# Patient Record
Sex: MALE | Race: WHITE | NOT HISPANIC OR LATINO | Employment: FULL TIME | URBAN - METROPOLITAN AREA
[De-identification: names, ages, dates, MRNs, and addresses within clinical notes are randomized per-mention and may not be internally consistent; named-entity substitution may affect disease eponyms.]

---

## 2017-09-04 ENCOUNTER — HOSPITAL ENCOUNTER (EMERGENCY)
Facility: HOSPITAL | Age: 22
Discharge: HOME | End: 2017-09-05
Attending: FAMILY MEDICINE
Payer: COMMERCIAL

## 2017-09-04 DIAGNOSIS — S93.432A SPRAIN OF TIBIOFIBULAR LIGAMENT OF LEFT ANKLE, INITIAL ENCOUNTER: Primary | ICD-10-CM
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
## 2017-09-04 PROCEDURE — 99283 EMERGENCY DEPT VISIT LOW MDM: CPT

## 2017-09-05 VITALS
SYSTOLIC BLOOD PRESSURE: 120 MMHG | RESPIRATION RATE: 16 BRPM | WEIGHT: 165 LBS | HEIGHT: 62 IN | BODY MASS INDEX: 30.36 KG/M2 | DIASTOLIC BLOOD PRESSURE: 62 MMHG | TEMPERATURE: 98 F | HEART RATE: 80 BPM

## 2017-09-05 NOTE — ED PROVIDER NOTES
Encounter Date: 9/4/2017       History     Chief Complaint   Patient presents with    Foot Pain     left ankle pain after a slip/fall down 4 steps earlier today     The history is provided by the patient. No  was used.   Leg Pain    The incident occurred at home. The injury mechanism was torsion. The incident occurred 2 to 3 hours ago. The pain is present in the left ankle. The quality of the pain is described as sharp. The pain is at a severity of 4/10. The pain has been intermittent since onset. Pertinent negatives include no numbness, no inability to bear weight, no loss of motion, no muscle weakness, no loss of sensation and no tingling. He reports no foreign bodies present. He has tried nothing for the symptoms. The treatment provided no relief.     Patient had an inversion injury of his left ankle to 3 hours ago.  Was able to limp but uses crutches.  He took no medication.  No knee or hip pain.    Review of patient's allergies indicates:  No Known Allergies  History reviewed. No pertinent past medical history.  Past Surgical History:   Procedure Laterality Date    KNEE SURGERY       History reviewed. No pertinent family history.  Social History   Substance Use Topics    Smoking status: Current Every Day Smoker     Packs/day: 1.00     Types: Cigarettes    Smokeless tobacco: Never Used    Alcohol use No     Review of Systems   Neurological: Negative for tingling and numbness.       Physical Exam     Initial Vitals [09/04/17 2229]   BP Pulse Resp Temp SpO2   131/62 83 16 98.2 °F (36.8 °C) --      MAP       85         Physical Exam    Nursing note and vitals reviewed.  Constitutional: He appears well-developed and well-nourished.   HENT:   Head: Normocephalic.   Eyes: Pupils are equal, round, and reactive to light.   Musculoskeletal:        Left ankle: He exhibits normal range of motion, no swelling, no ecchymosis, no deformity, no laceration and normal pulse. Tenderness. AITFL and CF  ligament tenderness found. No medial malleolus, no posterior TFL, no head of 5th metatarsal and no proximal fibula tenderness found. Achilles tendon normal.   Neurological: He is alert and oriented to person, place, and time. He has normal strength.   Psychiatric: He has a normal mood and affect.         ED Course   Procedures  Labs Reviewed - No data to display                            ED Course      Clinical Impression:   The primary encounter diagnosis was Sprain of tibiofibular ligament of left ankle, initial encounter. A diagnosis of Fall was also pertinent to this visit.                           Franklin Das MD  09/05/17 0011

## 2017-09-05 NOTE — PROVIDER PROGRESS NOTES - EMERGENCY DEPT.
Encounter Date: 9/4/2017    ED Physician Progress Notes           X-ray results discussed with the patient.  Indicated we would do RICE therapy.  He will stay off work for the next 2 days.

## 2017-09-05 NOTE — ED TRIAGE NOTES
22 y.o. male presents to ER ED 02/ED 02A   Chief Complaint   Patient presents with    Foot Pain     left ankle pain after a slip/fall down 4 steps earlier today   . Pt using crutches to ambulate without difficulty.